# Patient Record
Sex: FEMALE | URBAN - METROPOLITAN AREA
[De-identification: names, ages, dates, MRNs, and addresses within clinical notes are randomized per-mention and may not be internally consistent; named-entity substitution may affect disease eponyms.]

---

## 2024-06-15 ENCOUNTER — CLAIMS CREATED FROM THE CLAIM WINDOW (OUTPATIENT)
Dept: URBAN - METROPOLITAN AREA MEDICAL CENTER 31 | Facility: MEDICAL CENTER | Age: 54
End: 2024-06-15
Payer: COMMERCIAL

## 2024-06-15 DIAGNOSIS — R11.2 NAUSEA AND VOMITING: ICD-10-CM

## 2024-06-15 DIAGNOSIS — R10.11 ABDOMINAL PAIN, RIGHT UPPER QUADRANT: ICD-10-CM

## 2024-06-15 DIAGNOSIS — R74.8 ABNORMAL LIVER ENZYMES: ICD-10-CM

## 2024-06-15 DIAGNOSIS — R93.3 ABN FINDINGS-GI TRACT: ICD-10-CM

## 2024-06-15 PROCEDURE — G8427 DOCREV CUR MEDS BY ELIG CLIN: HCPCS | Performed by: INTERNAL MEDICINE

## 2024-06-15 PROCEDURE — 99254 IP/OBS CNSLTJ NEW/EST MOD 60: CPT | Performed by: INTERNAL MEDICINE

## 2024-06-15 PROCEDURE — 99222 1ST HOSP IP/OBS MODERATE 55: CPT | Performed by: INTERNAL MEDICINE

## 2024-06-16 ENCOUNTER — CLAIMS CREATED FROM THE CLAIM WINDOW (OUTPATIENT)
Dept: URBAN - METROPOLITAN AREA MEDICAL CENTER 31 | Facility: MEDICAL CENTER | Age: 54
End: 2024-06-16
Payer: COMMERCIAL

## 2024-06-16 DIAGNOSIS — R17 JAUNDICE: ICD-10-CM

## 2024-06-16 DIAGNOSIS — D64.89 NORMOCYTIC ANEMIA: ICD-10-CM

## 2024-06-16 DIAGNOSIS — R74.8 ABNORMAL LIVER ENZYMES: ICD-10-CM

## 2024-06-16 DIAGNOSIS — R10.13 DYSPEPSIA: ICD-10-CM

## 2024-06-16 PROCEDURE — 99232 SBSQ HOSP IP/OBS MODERATE 35: CPT | Performed by: INTERNAL MEDICINE

## 2024-06-17 ENCOUNTER — CLAIMS CREATED FROM THE CLAIM WINDOW (OUTPATIENT)
Dept: URBAN - METROPOLITAN AREA MEDICAL CENTER 31 | Facility: MEDICAL CENTER | Age: 54
End: 2024-06-17
Payer: COMMERCIAL

## 2024-06-17 DIAGNOSIS — R50.9 FEVER: ICD-10-CM

## 2024-06-17 DIAGNOSIS — D64.89 NORMOCYTIC ANEMIA: ICD-10-CM

## 2024-06-17 DIAGNOSIS — R74.8 ABNORMAL LIVER ENZYMES: ICD-10-CM

## 2024-06-17 PROCEDURE — 99232 SBSQ HOSP IP/OBS MODERATE 35: CPT | Performed by: INTERNAL MEDICINE

## 2024-06-18 ENCOUNTER — CLAIMS CREATED FROM THE CLAIM WINDOW (OUTPATIENT)
Dept: URBAN - METROPOLITAN AREA MEDICAL CENTER 31 | Facility: MEDICAL CENTER | Age: 54
End: 2024-06-18
Payer: COMMERCIAL

## 2024-06-18 DIAGNOSIS — R74.8 ABNORMAL LIVER ENZYMES: ICD-10-CM

## 2024-06-18 DIAGNOSIS — D64.89 NORMOCYTIC ANEMIA: ICD-10-CM

## 2024-06-18 DIAGNOSIS — R50.9 FEVER: ICD-10-CM

## 2024-06-18 PROCEDURE — 99232 SBSQ HOSP IP/OBS MODERATE 35: CPT | Performed by: INTERNAL MEDICINE

## 2024-06-19 ENCOUNTER — CLAIMS CREATED FROM THE CLAIM WINDOW (OUTPATIENT)
Dept: URBAN - METROPOLITAN AREA MEDICAL CENTER 31 | Facility: MEDICAL CENTER | Age: 54
End: 2024-06-19
Payer: COMMERCIAL

## 2024-06-19 DIAGNOSIS — R74.8 ABNORMAL LIVER ENZYMES: ICD-10-CM

## 2024-06-19 DIAGNOSIS — D64.89 NORMOCYTIC ANEMIA: ICD-10-CM

## 2024-06-19 DIAGNOSIS — R50.9 FEVER: ICD-10-CM

## 2024-06-19 PROCEDURE — 99232 SBSQ HOSP IP/OBS MODERATE 35: CPT | Performed by: INTERNAL MEDICINE

## 2024-06-21 ENCOUNTER — CLAIMS CREATED FROM THE CLAIM WINDOW (OUTPATIENT)
Dept: URBAN - METROPOLITAN AREA MEDICAL CENTER 31 | Facility: MEDICAL CENTER | Age: 54
End: 2024-06-21
Payer: COMMERCIAL

## 2024-06-21 DIAGNOSIS — R74.8 ABNORMAL LIVER ENZYMES: ICD-10-CM

## 2024-06-21 DIAGNOSIS — R12 HEART BURN: ICD-10-CM

## 2024-06-21 DIAGNOSIS — D64.89 NORMOCYTIC ANEMIA: ICD-10-CM

## 2024-06-21 DIAGNOSIS — R50.9 FEVER: ICD-10-CM

## 2024-06-21 PROCEDURE — 99232 SBSQ HOSP IP/OBS MODERATE 35: CPT | Performed by: INTERNAL MEDICINE

## 2024-06-24 ENCOUNTER — CLAIMS CREATED FROM THE CLAIM WINDOW (OUTPATIENT)
Dept: URBAN - METROPOLITAN AREA MEDICAL CENTER 31 | Facility: MEDICAL CENTER | Age: 54
End: 2024-06-24
Payer: COMMERCIAL

## 2024-06-24 DIAGNOSIS — R74.8 ABNORMAL LIVER ENZYMES: ICD-10-CM

## 2024-06-24 DIAGNOSIS — R50.9 FEVER: ICD-10-CM

## 2024-06-24 DIAGNOSIS — D64.89 NORMOCYTIC ANEMIA: ICD-10-CM

## 2024-06-24 PROCEDURE — 99232 SBSQ HOSP IP/OBS MODERATE 35: CPT | Performed by: INTERNAL MEDICINE

## 2024-06-25 ENCOUNTER — CLAIMS CREATED FROM THE CLAIM WINDOW (OUTPATIENT)
Dept: URBAN - METROPOLITAN AREA MEDICAL CENTER 31 | Facility: MEDICAL CENTER | Age: 54
End: 2024-06-25
Payer: COMMERCIAL

## 2024-06-25 DIAGNOSIS — D64.89 NORMOCYTIC ANEMIA: ICD-10-CM

## 2024-06-25 DIAGNOSIS — R50.9 FEVER: ICD-10-CM

## 2024-06-25 DIAGNOSIS — R74.8 ABNORMAL LIVER ENZYMES: ICD-10-CM

## 2024-06-25 PROCEDURE — 99232 SBSQ HOSP IP/OBS MODERATE 35: CPT | Performed by: INTERNAL MEDICINE

## 2024-06-26 ENCOUNTER — CLAIMS CREATED FROM THE CLAIM WINDOW (OUTPATIENT)
Dept: URBAN - METROPOLITAN AREA MEDICAL CENTER 31 | Facility: MEDICAL CENTER | Age: 54
End: 2024-06-26

## 2024-06-26 PROCEDURE — 99232 SBSQ HOSP IP/OBS MODERATE 35: CPT | Performed by: STUDENT IN AN ORGANIZED HEALTH CARE EDUCATION/TRAINING PROGRAM

## 2024-06-27 ENCOUNTER — CLAIMS CREATED FROM THE CLAIM WINDOW (OUTPATIENT)
Dept: URBAN - METROPOLITAN AREA MEDICAL CENTER 31 | Facility: MEDICAL CENTER | Age: 54
End: 2024-06-27

## 2024-06-27 PROCEDURE — 99232 SBSQ HOSP IP/OBS MODERATE 35: CPT | Performed by: STUDENT IN AN ORGANIZED HEALTH CARE EDUCATION/TRAINING PROGRAM

## 2024-07-02 ENCOUNTER — TELEPHONE ENCOUNTER (OUTPATIENT)
Dept: URBAN - METROPOLITAN AREA CLINIC 82 | Facility: CLINIC | Age: 54
End: 2024-07-02

## 2024-07-03 ENCOUNTER — OFFICE VISIT (OUTPATIENT)
Dept: URBAN - METROPOLITAN AREA CLINIC 12 | Facility: CLINIC | Age: 54
End: 2024-07-03
Payer: COMMERCIAL

## 2024-07-03 ENCOUNTER — DASHBOARD ENCOUNTERS (OUTPATIENT)
Age: 54
End: 2024-07-03

## 2024-07-03 VITALS
HEIGHT: 61 IN | TEMPERATURE: 97.7 F | SYSTOLIC BLOOD PRESSURE: 102 MMHG | HEART RATE: 103 BPM | DIASTOLIC BLOOD PRESSURE: 68 MMHG | WEIGHT: 138 LBS | BODY MASS INDEX: 26.06 KG/M2

## 2024-07-03 DIAGNOSIS — K72.00 SUBACUTE LIVER FAILURE WITHOUT HEPATIC COMA: ICD-10-CM

## 2024-07-03 DIAGNOSIS — R17 JAUNDICE: ICD-10-CM

## 2024-07-03 DIAGNOSIS — K75.4 AUTOIMMUNE HEPATITIS: ICD-10-CM

## 2024-07-03 PROCEDURE — 99205 OFFICE O/P NEW HI 60 MIN: CPT | Performed by: INTERNAL MEDICINE

## 2024-07-03 NOTE — HPI-TODAY'S VISIT:
54-year-old female, Gabriela Rodriguez, presents with a history of swelling in the abdomen and legs, jaundice, and altered mental status. The patient has been experiencing these symptoms for approximately 2-3 months. She was treated with doxycycline for a skin infection about 2 months ago, after which her condition worsened. She was admitted to [facility] ICU with a bilirubin level of 21, AST and ALT in the upper 800 to 1000 range, and an INR of 2. 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 The patient also reports slow speech, nosebleeds, and lethargy. There is a possibility of lymphoma, but the liver biopsy does not show active lymphoma, and the bone marrow biopsy results are still pending. accompanied by her

## 2024-07-03 NOTE — PHYSICAL EXAM CONSTITUTIONAL:
jaundiced, weak, lying down on clinic table on side sleeping , well developed, well nourished , in no acute distress , ambulating without difficulty , normal communication ability

## 2024-07-03 NOTE — PREVIOUS WORKUP REVIEWED EXTERNAL MEDICAL RECORD
.ENDOSCOPIESLABSliver biopsy performed at [place] 6/25/2024- changes c/w autoimmune hep vs DILIdischarge labs from [place] BILI 19.7AST 701ALT 694ALB 3.2CREATININE 0.5PLT 148INR 2.2IMAGES Discussed with the principal.   Would rec. She return to school with the acommidations   Principal and staff will work to ensure she is well.

## 2024-07-17 PROBLEM — 408335007: Status: ACTIVE | Noted: 2024-07-17
